# Patient Record
Sex: MALE | Race: WHITE | URBAN - METROPOLITAN AREA
[De-identification: names, ages, dates, MRNs, and addresses within clinical notes are randomized per-mention and may not be internally consistent; named-entity substitution may affect disease eponyms.]

---

## 2018-01-25 ENCOUNTER — APPOINTMENT (OUTPATIENT)
Dept: CT IMAGING | Facility: CLINIC | Age: 48
DRG: 194 | End: 2018-01-25
Attending: EMERGENCY MEDICINE
Payer: COMMERCIAL

## 2018-01-25 ENCOUNTER — HOSPITAL ENCOUNTER (INPATIENT)
Facility: CLINIC | Age: 48
LOS: 2 days | Discharge: HOME | DRG: 194 | End: 2018-01-27
Attending: EMERGENCY MEDICINE | Admitting: INTERNAL MEDICINE
Payer: COMMERCIAL

## 2018-01-25 ENCOUNTER — APPOINTMENT (OUTPATIENT)
Dept: GENERAL RADIOLOGY | Facility: CLINIC | Age: 48
DRG: 194 | End: 2018-01-25
Attending: EMERGENCY MEDICINE
Payer: COMMERCIAL

## 2018-01-25 DIAGNOSIS — J18.9 PNEUMONIA OF BOTH LUNGS DUE TO INFECTIOUS ORGANISM, UNSPECIFIED PART OF LUNG: ICD-10-CM

## 2018-01-25 LAB
ANION GAP SERPL CALCULATED.3IONS-SCNC: 8 MMOL/L (ref 3–14)
BASOPHILS # BLD AUTO: 0 10E9/L (ref 0–0.2)
BASOPHILS NFR BLD AUTO: 0.3 %
BUN SERPL-MCNC: 19 MG/DL (ref 7–30)
CALCIUM SERPL-MCNC: 8.7 MG/DL (ref 8.5–10.1)
CHLORIDE SERPL-SCNC: 88 MMOL/L (ref 94–109)
CO2 SERPL-SCNC: 29 MMOL/L (ref 20–32)
CREAT SERPL-MCNC: 1.01 MG/DL (ref 0.66–1.25)
DIFFERENTIAL METHOD BLD: ABNORMAL
EOSINOPHIL # BLD AUTO: 0 10E9/L (ref 0–0.7)
EOSINOPHIL NFR BLD AUTO: 0 %
ERYTHROCYTE [DISTWIDTH] IN BLOOD BY AUTOMATED COUNT: 11.8 % (ref 10–15)
FLUAV+FLUBV AG SPEC QL: NEGATIVE
FLUAV+FLUBV AG SPEC QL: NEGATIVE
GFR SERPL CREATININE-BSD FRML MDRD: 79 ML/MIN/1.7M2
GLUCOSE SERPL-MCNC: 110 MG/DL (ref 70–99)
HCT VFR BLD AUTO: 41.9 % (ref 40–53)
HGB BLD-MCNC: 14.9 G/DL (ref 13.3–17.7)
IMM GRANULOCYTES # BLD: 0.3 10E9/L (ref 0–0.4)
IMM GRANULOCYTES NFR BLD: 2.6 %
LACTATE SERPL-SCNC: 1.2 MMOL/L (ref 0.4–2)
LYMPHOCYTES # BLD AUTO: 1.1 10E9/L (ref 0.8–5.3)
LYMPHOCYTES NFR BLD AUTO: 8.7 %
MAGNESIUM SERPL-MCNC: 2.2 MG/DL (ref 1.6–2.3)
MCH RBC QN AUTO: 32 PG (ref 26.5–33)
MCHC RBC AUTO-ENTMCNC: 35.6 G/DL (ref 31.5–36.5)
MCV RBC AUTO: 90 FL (ref 78–100)
MONOCYTES # BLD AUTO: 1.4 10E9/L (ref 0–1.3)
MONOCYTES NFR BLD AUTO: 11.1 %
NEUTROPHILS # BLD AUTO: 9.8 10E9/L (ref 1.6–8.3)
NEUTROPHILS NFR BLD AUTO: 77.3 %
NRBC # BLD AUTO: 0 10*3/UL
NRBC BLD AUTO-RTO: 0 /100
PLATELET # BLD AUTO: 335 10E9/L (ref 150–450)
POTASSIUM SERPL-SCNC: 2.6 MMOL/L (ref 3.4–5.3)
RBC # BLD AUTO: 4.66 10E12/L (ref 4.4–5.9)
SODIUM SERPL-SCNC: 125 MMOL/L (ref 133–144)
SPECIMEN SOURCE: NORMAL
WBC # BLD AUTO: 12.7 10E9/L (ref 4–11)

## 2018-01-25 PROCEDURE — 85025 COMPLETE CBC W/AUTO DIFF WBC: CPT | Performed by: PHYSICIAN ASSISTANT

## 2018-01-25 PROCEDURE — 25000128 H RX IP 250 OP 636: Performed by: EMERGENCY MEDICINE

## 2018-01-25 PROCEDURE — 80048 BASIC METABOLIC PNL TOTAL CA: CPT | Performed by: PHYSICIAN ASSISTANT

## 2018-01-25 PROCEDURE — 87040 BLOOD CULTURE FOR BACTERIA: CPT | Performed by: EMERGENCY MEDICINE

## 2018-01-25 PROCEDURE — 99223 1ST HOSP IP/OBS HIGH 75: CPT | Mod: AI | Performed by: INTERNAL MEDICINE

## 2018-01-25 PROCEDURE — 96361 HYDRATE IV INFUSION ADD-ON: CPT

## 2018-01-25 PROCEDURE — 25000128 H RX IP 250 OP 636: Performed by: PHYSICIAN ASSISTANT

## 2018-01-25 PROCEDURE — 12000000 ZZH R&B MED SURG/OB

## 2018-01-25 PROCEDURE — 87804 INFLUENZA ASSAY W/OPTIC: CPT | Performed by: EMERGENCY MEDICINE

## 2018-01-25 PROCEDURE — 83735 ASSAY OF MAGNESIUM: CPT | Performed by: PHYSICIAN ASSISTANT

## 2018-01-25 PROCEDURE — 96367 TX/PROPH/DG ADDL SEQ IV INF: CPT

## 2018-01-25 PROCEDURE — 99285 EMERGENCY DEPT VISIT HI MDM: CPT | Mod: 25

## 2018-01-25 PROCEDURE — 25000132 ZZH RX MED GY IP 250 OP 250 PS 637: Performed by: EMERGENCY MEDICINE

## 2018-01-25 PROCEDURE — 71260 CT THORAX DX C+: CPT

## 2018-01-25 PROCEDURE — 96365 THER/PROPH/DIAG IV INF INIT: CPT

## 2018-01-25 PROCEDURE — 36415 COLL VENOUS BLD VENIPUNCTURE: CPT

## 2018-01-25 PROCEDURE — 96366 THER/PROPH/DIAG IV INF ADDON: CPT

## 2018-01-25 PROCEDURE — 83605 ASSAY OF LACTIC ACID: CPT | Performed by: EMERGENCY MEDICINE

## 2018-01-25 PROCEDURE — 36415 COLL VENOUS BLD VENIPUNCTURE: CPT | Performed by: EMERGENCY MEDICINE

## 2018-01-25 PROCEDURE — 96375 TX/PRO/DX INJ NEW DRUG ADDON: CPT

## 2018-01-25 PROCEDURE — 93005 ELECTROCARDIOGRAM TRACING: CPT

## 2018-01-25 PROCEDURE — 71046 X-RAY EXAM CHEST 2 VIEWS: CPT

## 2018-01-25 RX ORDER — POTASSIUM CHLORIDE 29.8 MG/ML
20 INJECTION INTRAVENOUS
Status: DISCONTINUED | OUTPATIENT
Start: 2018-01-25 | End: 2018-01-27 | Stop reason: HOSPADM

## 2018-01-25 RX ORDER — LISINOPRIL AND HYDROCHLOROTHIAZIDE 12.5; 2 MG/1; MG/1
2 TABLET ORAL DAILY
COMMUNITY

## 2018-01-25 RX ORDER — MAGNESIUM SULFATE HEPTAHYDRATE 40 MG/ML
4 INJECTION, SOLUTION INTRAVENOUS EVERY 4 HOURS PRN
Status: DISCONTINUED | OUTPATIENT
Start: 2018-01-25 | End: 2018-01-27 | Stop reason: HOSPADM

## 2018-01-25 RX ORDER — ONDANSETRON 2 MG/ML
4 INJECTION INTRAMUSCULAR; INTRAVENOUS EVERY 30 MIN PRN
Status: DISCONTINUED | OUTPATIENT
Start: 2018-01-25 | End: 2018-01-26

## 2018-01-25 RX ORDER — LISINOPRIL 20 MG/1
20 TABLET ORAL DAILY
Status: DISCONTINUED | OUTPATIENT
Start: 2018-01-26 | End: 2018-01-27 | Stop reason: HOSPADM

## 2018-01-25 RX ORDER — ACETAMINOPHEN 325 MG/1
650 TABLET ORAL EVERY 4 HOURS PRN
Status: DISCONTINUED | OUTPATIENT
Start: 2018-01-25 | End: 2018-01-27 | Stop reason: HOSPADM

## 2018-01-25 RX ORDER — ONDANSETRON 2 MG/ML
4 INJECTION INTRAMUSCULAR; INTRAVENOUS EVERY 6 HOURS PRN
Status: DISCONTINUED | OUTPATIENT
Start: 2018-01-25 | End: 2018-01-27 | Stop reason: HOSPADM

## 2018-01-25 RX ORDER — AMLODIPINE BESYLATE 5 MG/1
5 TABLET ORAL DAILY
Status: DISCONTINUED | OUTPATIENT
Start: 2018-01-26 | End: 2018-01-27 | Stop reason: HOSPADM

## 2018-01-25 RX ORDER — IBUPROFEN 600 MG/1
600 TABLET, FILM COATED ORAL EVERY 6 HOURS PRN
Status: DISCONTINUED | OUTPATIENT
Start: 2018-01-25 | End: 2018-01-27 | Stop reason: HOSPADM

## 2018-01-25 RX ORDER — OXYCODONE HYDROCHLORIDE 5 MG/1
5 TABLET ORAL EVERY 4 HOURS PRN
Status: DISCONTINUED | OUTPATIENT
Start: 2018-01-25 | End: 2018-01-27 | Stop reason: HOSPADM

## 2018-01-25 RX ORDER — IBUPROFEN 800 MG/1
800 TABLET, FILM COATED ORAL EVERY 8 HOURS PRN
COMMUNITY

## 2018-01-25 RX ORDER — AMLODIPINE BESYLATE 5 MG/1
5 TABLET ORAL DAILY
COMMUNITY

## 2018-01-25 RX ORDER — POTASSIUM CHLORIDE 1500 MG/1
20-40 TABLET, EXTENDED RELEASE ORAL
Status: DISCONTINUED | OUTPATIENT
Start: 2018-01-25 | End: 2018-01-27 | Stop reason: HOSPADM

## 2018-01-25 RX ORDER — ACETAMINOPHEN 325 MG/1
650 TABLET ORAL ONCE
Status: COMPLETED | OUTPATIENT
Start: 2018-01-25 | End: 2018-01-25

## 2018-01-25 RX ORDER — PROCHLORPERAZINE 25 MG
25 SUPPOSITORY, RECTAL RECTAL EVERY 12 HOURS PRN
Status: DISCONTINUED | OUTPATIENT
Start: 2018-01-25 | End: 2018-01-27 | Stop reason: HOSPADM

## 2018-01-25 RX ORDER — POTASSIUM CHLORIDE 1.5 G/1.58G
20 POWDER, FOR SOLUTION ORAL ONCE
Status: COMPLETED | OUTPATIENT
Start: 2018-01-25 | End: 2018-01-25

## 2018-01-25 RX ORDER — POTASSIUM CL/LIDO/0.9 % NACL 10MEQ/0.1L
10 INTRAVENOUS SOLUTION, PIGGYBACK (ML) INTRAVENOUS
Status: DISCONTINUED | OUTPATIENT
Start: 2018-01-25 | End: 2018-01-27 | Stop reason: HOSPADM

## 2018-01-25 RX ORDER — SODIUM CHLORIDE AND POTASSIUM CHLORIDE 150; 900 MG/100ML; MG/100ML
INJECTION, SOLUTION INTRAVENOUS CONTINUOUS
Status: DISPENSED | OUTPATIENT
Start: 2018-01-25 | End: 2018-01-26

## 2018-01-25 RX ORDER — NALOXONE HYDROCHLORIDE 0.4 MG/ML
.1-.4 INJECTION, SOLUTION INTRAMUSCULAR; INTRAVENOUS; SUBCUTANEOUS
Status: DISCONTINUED | OUTPATIENT
Start: 2018-01-25 | End: 2018-01-27 | Stop reason: HOSPADM

## 2018-01-25 RX ORDER — IBUPROFEN 600 MG/1
600 TABLET, FILM COATED ORAL ONCE
Status: COMPLETED | OUTPATIENT
Start: 2018-01-25 | End: 2018-01-25

## 2018-01-25 RX ORDER — IOPAMIDOL 755 MG/ML
500 INJECTION, SOLUTION INTRAVASCULAR ONCE
Status: COMPLETED | OUTPATIENT
Start: 2018-01-25 | End: 2018-01-25

## 2018-01-25 RX ORDER — CEFTRIAXONE SODIUM 2 G/50ML
2 INJECTION, SOLUTION INTRAVENOUS ONCE
Status: COMPLETED | OUTPATIENT
Start: 2018-01-25 | End: 2018-01-25

## 2018-01-25 RX ORDER — CALCIUM CARBONATE 500 MG/1
1000 TABLET, CHEWABLE ORAL 4 TIMES DAILY PRN
Status: DISCONTINUED | OUTPATIENT
Start: 2018-01-25 | End: 2018-01-27 | Stop reason: HOSPADM

## 2018-01-25 RX ORDER — POTASSIUM CHLORIDE 7.45 MG/ML
10 INJECTION INTRAVENOUS
Status: DISCONTINUED | OUTPATIENT
Start: 2018-01-25 | End: 2018-01-27 | Stop reason: HOSPADM

## 2018-01-25 RX ORDER — CEFTRIAXONE SODIUM 2 G/50ML
2 INJECTION, SOLUTION INTRAVENOUS EVERY 24 HOURS
Status: DISCONTINUED | OUTPATIENT
Start: 2018-01-26 | End: 2018-01-27 | Stop reason: HOSPADM

## 2018-01-25 RX ORDER — ACETAMINOPHEN 500 MG
1000 TABLET ORAL EVERY 6 HOURS PRN
COMMUNITY

## 2018-01-25 RX ORDER — POTASSIUM CHLORIDE 1.5 G/1.58G
20-40 POWDER, FOR SOLUTION ORAL
Status: DISCONTINUED | OUTPATIENT
Start: 2018-01-25 | End: 2018-01-27 | Stop reason: HOSPADM

## 2018-01-25 RX ORDER — SODIUM CHLORIDE 9 MG/ML
1000 INJECTION, SOLUTION INTRAVENOUS CONTINUOUS
Status: DISCONTINUED | OUTPATIENT
Start: 2018-01-25 | End: 2018-01-27 | Stop reason: HOSPADM

## 2018-01-25 RX ORDER — ALBUTEROL SULFATE 0.83 MG/ML
2.5 SOLUTION RESPIRATORY (INHALATION) EVERY 4 HOURS PRN
Status: DISCONTINUED | OUTPATIENT
Start: 2018-01-25 | End: 2018-01-27 | Stop reason: HOSPADM

## 2018-01-25 RX ORDER — DIPHENHYDRAMINE HYDROCHLORIDE 50 MG/ML
25 INJECTION INTRAMUSCULAR; INTRAVENOUS ONCE
Status: COMPLETED | OUTPATIENT
Start: 2018-01-25 | End: 2018-01-25

## 2018-01-25 RX ORDER — ONDANSETRON 4 MG/1
4 TABLET, ORALLY DISINTEGRATING ORAL EVERY 6 HOURS PRN
Status: DISCONTINUED | OUTPATIENT
Start: 2018-01-25 | End: 2018-01-27 | Stop reason: HOSPADM

## 2018-01-25 RX ORDER — PROCHLORPERAZINE MALEATE 5 MG
10 TABLET ORAL EVERY 6 HOURS PRN
Status: DISCONTINUED | OUTPATIENT
Start: 2018-01-25 | End: 2018-01-27 | Stop reason: HOSPADM

## 2018-01-25 RX ADMIN — SODIUM CHLORIDE 66 ML: 9 INJECTION, SOLUTION INTRAVENOUS at 19:04

## 2018-01-25 RX ADMIN — Medication 10 MEQ: at 16:51

## 2018-01-25 RX ADMIN — PROCHLORPERAZINE EDISYLATE 5 MG: 5 INJECTION INTRAMUSCULAR; INTRAVENOUS at 16:48

## 2018-01-25 RX ADMIN — SODIUM CHLORIDE 1000 ML: 9 INJECTION, SOLUTION INTRAVENOUS at 15:07

## 2018-01-25 RX ADMIN — SODIUM CHLORIDE 1000 ML: 9 INJECTION, SOLUTION INTRAVENOUS at 18:20

## 2018-01-25 RX ADMIN — IBUPROFEN 600 MG: 600 TABLET ORAL at 20:50

## 2018-01-25 RX ADMIN — CEFTRIAXONE SODIUM 2 G: 2 INJECTION, SOLUTION INTRAVENOUS at 18:20

## 2018-01-25 RX ADMIN — ONDANSETRON 4 MG: 2 INJECTION INTRAMUSCULAR; INTRAVENOUS at 15:07

## 2018-01-25 RX ADMIN — SODIUM CHLORIDE 1000 ML: 9 INJECTION, SOLUTION INTRAVENOUS at 16:30

## 2018-01-25 RX ADMIN — POTASSIUM CHLORIDE 20 MEQ: 1.5 POWDER, FOR SOLUTION ORAL at 19:48

## 2018-01-25 RX ADMIN — IOPAMIDOL 80 ML: 755 INJECTION, SOLUTION INTRAVENOUS at 19:03

## 2018-01-25 RX ADMIN — AZITHROMYCIN MONOHYDRATE 500 MG: 500 INJECTION, POWDER, LYOPHILIZED, FOR SOLUTION INTRAVENOUS at 18:45

## 2018-01-25 RX ADMIN — ACETAMINOPHEN 650 MG: 325 TABLET, FILM COATED ORAL at 22:08

## 2018-01-25 RX ADMIN — DIPHENHYDRAMINE HYDROCHLORIDE 25 MG: 50 INJECTION, SOLUTION INTRAMUSCULAR; INTRAVENOUS at 16:48

## 2018-01-25 ASSESSMENT — ENCOUNTER SYMPTOMS
COUGH: 1
HEADACHES: 1
DIAPHORESIS: 1
NAUSEA: 1
NECK PAIN: 1
VOMITING: 0
DIZZINESS: 1
FEVER: 1
DYSURIA: 0
BACK PAIN: 1

## 2018-01-25 NOTE — IP AVS SNAPSHOT
[place] [address] [facility]    [address]    [facility] [address]    Phone:  [phone]    Fax:  [phone]                                       After Visit Summary   1/25/2018    Cb Og    MRN: [id number]           After Visit Summary Signature Page     I have received my discharge instructions, and my questions have been answered. I have discussed any challenges I see with this plan with the nurse or doctor.    ..........................................................................................................................................  Patient/Patient Representative Signature      ..........................................................................................................................................  Patient Representative Print Name and Relationship to Patient    ..................................................               ................................................  Date                                            Time    ..........................................................................................................................................  Reviewed by Signature/Title    ...................................................              ..............................................  Date                                                            Time

## 2018-01-25 NOTE — ED PROVIDER NOTES
History     Chief Complaint:  Flu Symptoms    HPI   Cb Og is a 47 year old male who presents with flu symptoms. The patient reports he came home early from work last Thursday not feeling well and spent the rest of the day in bed. He states he has had a fever and been coughing since then. The patient reports that he began vomiting a lot on Sunday, although he has not vomited since then. He states he is still nauseous though. The patient also reports having a constant headache for the last 5 days that is dulled when he takes ibuprofen and Tylenol (last dose around 10 am today), but it has never fully gone away. The patient decided to come to the emergency department today because his symptoms have stayed the same and not gone away. He reports he is dizzy and diaphoretic when he tries to walk. He also states has some pain at the base of his neck and back pain due to a pulled muscle from coughing so much. He states he has been exposed to illness at work and that he did not receive a flu shot. He denies dysuria or current vomiting.      Allergies:  No known drug allergies     Medications:    Lisinopril    Past Medical History:    Hypertension    Past Surgical History:    History reviewed. No pertinent surgical history.    Family History:    History reviewed. No pertinent family history.     Social History:  Smoking status: Not on file  Alcohol use: Not on file  Marital Status:  Single [1]     Review of Systems   Constitutional: Positive for diaphoresis and fever.   Respiratory: Positive for cough.    Gastrointestinal: Positive for nausea. Negative for vomiting.   Genitourinary: Negative for dysuria.   Musculoskeletal: Positive for back pain and neck pain.   Neurological: Positive for dizziness and headaches.   All other systems reviewed and are negative.    Physical Exam     Patient Vitals for the past 24 hrs:   BP Temp Temp src Heart Rate Resp SpO2   01/25/18 2045 - 100.9  F (38.3  C) Oral - - -   01/25/18  2015 - - - - - 93 %   01/25/18 2000 - - - - - 94 %   01/25/18 1951 - - - - - 94 %   01/25/18 1949 112/80 - - - - -   01/25/18 1830 - - - - - 94 %   01/25/18 1730 101/71 - - - - 96 %   01/25/18 1715 - - - - - 96 %   01/25/18 1714 107/68 - - - - -   01/25/18 1645 - - - 89 27 97 %   01/25/18 1630 - - - 89 (!) 32 96 %   01/25/18 1615 - - - 89 (!) 33 94 %   01/25/18 1602 115/80 - - - - 93 %   01/25/18 1404 106/84 98.1  F (36.7  C) Oral 109 16 96 %       Physical Exam  General: Patient is alert and interactive when I enter the room, laying in bed, tired appearing  Head:  The scalp, face, and head appear normal  Eyes:  Conjunctivae are normal  ENT:    The nose is normal    Pinnae are normal    External acoustic canals are normal, dry mucous membranes   Neck:  Trachea midline, good ROM  CV:  Pulses are normal, RRR.    Resp:  No respiratory distress, crackles on right upper lobe, mild expiratory wheezing, left side clear   Abdomen:      Soft, non-tender, non-distended  Musc:  Normal muscular tone    No major joint effusions    No asymmetric leg swelling    Good capillary refill noted  Skin:  No rash or lesions noted  Neuro:  Speech is normal and fluent. Face is symmetric.     Moving all extremities well.   Psych: Awake. Alert.  Normal affect.  Appropriate interactions.    Emergency Department Course   ECG (16:04:12):  Rate 93 bpm. NJ interval 152. QRS duration 96. QT/QTc 368/457. P-R-T axes 29 19 22. Normal sinus rhythm. Normal ECG. Interpreted at 1716 by Inés Muhammad MD.    Imaging:  Radiographic findings were communicated with the patient who voiced understanding of the findings.    XR Chest 2 Views:  7 cm area of opacity right upper lung. This may be  infiltrate. Underlying mass cannot be excluded.  As read by Radiology.    CT Chest Pulmonary Embolism w Contrast:  Bilateral consolidative infiltrate/pneumonia, greater on  the right. Short-term radiographic follow-up after therapy recommended  to confirm resolution.  Mild adenopathy.  As read by Radiology.    Laboratory:  Influenza A/B Antigen: negative  CBC: WBC 12.7, o/w WNL (HGB 14.9, )   BMP:  (L), Potassium 2.6 (LL), Chloride 88 (L), Glucose 110 (H), o/w WNL (Creatinine 1.01)  Magnesium: 2.2  Lactic acid: 1.2    Blood culture: in process    Interventions:  1507: NS 1L IV Bolus  1507: Zofran 4 mg IV  1630: NS 1L IV Bolus  1648: Compazine 5 mg IV  1648: Benadryl 25 mg IV  1651: Potassium Chloride 10 mEq in 100 ml IV  1820: NS 1L IV Bolus  1820: Rocephin 2 g IV  1845: Zithromax 500 mg IV  1948: Potassium chloride 20 mEq PO  2050: Ibuprofen 600 mg PO    Emergency Department Course:  Past medical records, nursing notes, and vitals reviewed.  1609: I performed an exam of the patient and obtained history, as documented above.  IV inserted and blood drawn.  The patient was sent for a chest x-ray while in the emergency department, findings above.  EKG obtained, results above.    2030: I rechecked the patient. Explained findings to the patient.    2041: I spoke to Dr. Toledo of the hospitalist service who accepts the patient for admission.     Findings and plan explained to the Patient who consents to admission.     Discussed the patient with Dr. Toledo, who will admit the patient to a med bed for further monitoring, evaluation, and treatment.     Impression & Plan    Medical Decision Making:  Cb Og is a 47 year old gentleman who presents with flu-like symptoms and fever for the past 7 days. On arrival, patient was afebrile but tachycardic to 106 with soft blood pressures. He was not hypoxic. He appeared quite unwell, appeared fatigued and dehydrated. IV fluids were given and blood work was checked. His white count was elevated at 12.7 and he was quite hyponatremic at 125 and hypokalemic at 2.6. His magnesium was normal. Patient was given 2 liters of fluids. His lactate was normal at 1.2. I did obtain a chest x-ray that revealed a pretty large right upper lung  opacity about 7 cm, which is likely infiltrate, however cannot rule out an underlying mass. With this, I did obtain a CT PE study. This revealed bilateral consolidation and infiltrate greater on the right. Patient felt improved after fluids and was not hypoxic, however, he definitely has a large pneumonia burden. Blood cultures were obtained and antibiotics of Rocephin and Zithromax were given. He does not have any risk factors for hospital acquired pneumonia so I do not think he needs broad-spectrum antibiotics, however, this might change. He is at high risk for decompensation. Influenza was negative both here and at clinic but it is possible that this is influenza as well. Patient was admitted to medicine for treatment of his pneumonia and continued observation.    Diagnosis:    ICD-10-CM    1. Pneumonia of both lungs due to infectious organism, unspecified part of lung J18.9        Disposition:  Admitted    Amy Samanta  1/25/2018   [facility] EMERGENCY DEPARTMENT    IAmy, am serving as a scribe at 4:09 PM on 1/25/2018 to document services personally performed by Inés Muhammad MD based on my observations and the provider's statements to me.        Inés Muhammad MD  01/25/18 6257

## 2018-01-25 NOTE — IP AVS SNAPSHOT
MRN:[id number]                      After Visit Summary   1/25/2018    Cb Og    MRN: [id number]           Thank you!     Thank you for choosing [facility] for your care. Our goal is always to provide you with excellent care. Hearing back from our patients is one way we can continue to improve our services. Please take a few minutes to complete the written survey that you may receive in the mail after you visit. If you would like to speak to someone directly about your visit please contact Patient Relations at [phone]. Thank you!          Patient Information     Date Of Birth          1970        Designated Caregiver       Most Recent Value    Caregiver    Will someone help with your care after discharge? yes    Name of designated caregiver Natalie    Phone number of caregiver see chart    Caregiver address see chart      About your hospital stay     You were admitted on:  January 25, 2018 You last received care in the:  [place] [address] [facility]    You were discharged on:  January 27, 2018        Reason for your hospital stay       Pneumonia                  Who to Call     For medical emergencies, please call 911.  For non-urgent questions about your medical care, please call your primary care provider or clinic, [phone]          Attending Provider     Provider Specialty    Inés Muhammad MD Emergency Medicine    Barry Toledo MD Internal Medicine       Primary Care Provider Office Phone # Fax #    [facility] [phone]       When to contact your care team       Call your primary doctor if you have any of the following:  increased shortness of breath.                  After Care Instructions     Activity       Your activity upon discharge: activity as tolerated            Diet       Follow this diet upon discharge: Cardiac                  Follow-up Appointments     Follow-up and recommended labs and tests         "Follow up with primary care provider, Rudy [facility], within 7 days for hospital follow- up.  The following labs/tests are recommended: CXR in few weeks .                  Pending Results     Date and Time Order Name Status Description    2018 1638 Blood culture Preliminary     2018 1638 Blood culture Preliminary             Statement of Approval     Ordered          18 1058  I have reviewed and agree with all the recommendations and orders detailed in this document.  EFFECTIVE NOW     Approved and electronically signed by:  Meliton Chavira MD             Admission Information     Date & Time Provider Department Dept. Phone    2018 Barry Toledo MD [place] [address] [facility] [phone]      Your Vitals Were     Blood Pressure Pulse Temperature Respirations Height Weight    118/87 (BP Location: Right arm) 78 98.7  F (37.1  C) (Oral) 18 1.829 m (6') 112.6 kg (248 lb 3.2 oz)    Pulse Oximetry BMI (Body Mass Index)                95% 33.66 kg/m2          [employer] Information     [facility] [facility] lets you send messages to your doctor, view your test results, renew your prescriptions, schedule appointments and more. To sign up, go to [email or web address] . Click on \"Log in\" on the left side of the screen, which will take you to the Welcome page. Then click on \"Sign up Now\" on the right side of the page.     You will be asked to enter the access code listed below, as well as some personal information. Please follow the directions to create your username and password.     Your access code is: [email or web address]  Expires: 2018 11:02 AM     Your access code will  in 90 days. If you need help or a new code, please call your [facility] clinic or [phone].        Care EveryWhere ID     This is your Care EveryWhere ID. This could be used by other organizations to access your [facility] medical records  [id number]        Equal Access to Services     KAILEY BEVERLY AH: Loulouii donald delacruz " danuta Teran, waaxda luqadaha, qaybta kaalmada adetania, dane [facility] [place] [facility] [phone].    ATENCIÓN: Si gertrudela milan, tiene a wong disposición servicios gratuitos de asistencia lingüística. Gene al [phone].    We comply with applicable federal civil rights laws and [place] laws. We do not discriminate on the basis of race, color, national origin, age, disability, sex, sexual orientation, or gender identity.               Review of your medicines      START taking        Dose / Directions    levofloxacin 500 MG tablet   Commonly known as:  LEVAQUIN   Used for:  Pneumonia of both lungs due to infectious organism, unspecified part of lung        Dose:  500 mg   Take 1 tablet (500 mg) by mouth daily for 7 days   Quantity:  7 tablet   Refills:  0         CONTINUE these medicines which have NOT CHANGED        Dose / Directions    acetaminophen 500 MG tablet   Commonly known as:  TYLENOL        Dose:  1000 mg   Take 1,000 mg by mouth every 6 hours as needed for mild pain   Refills:  0       amLODIPine 5 MG tablet   Commonly known as:  NORVASC        Dose:  5 mg   Take 5 mg by mouth daily   Refills:  0       ibuprofen 800 MG tablet   Commonly known as:  ADVIL/MOTRIN        Dose:  800 mg   Take 800 mg by mouth every 8 hours as needed for moderate pain   Refills:  0       lisinopril-hydrochlorothiazide 20-12.5 MG per tablet   Commonly known as:  PRINZIDE/ZESTORETIC        Dose:  2 tablet   Take 2 tablets by mouth daily   Refills:  0            Where to get your medicines      These medications were sent to [facility]/pharmacy #[id number] - [place], [place] - [address]  [address]  [address] , [facility] [address]     Phone:  [phone]     levofloxacin 500 MG tablet                Protect others around you: Learn how to safely use, store and throw away your medicines at www.disposemymeds.org.        ANTIBIOTIC INSTRUCTION     You've Been Prescribed an Antibiotic - Now What?  Your  healthcare team thinks that you or your loved one might have an infection. Some infections can be treated with antibiotics, which are powerful, life-saving drugs. Like all medications, antibiotics have side effects and should only be used when necessary. There are some important things you should know about your antibiotic treatment.      Your healthcare team may run tests before you start taking an antibiotic.    Your team may take samples (e.g., from your blood, urine or other areas) to run tests to look for bacteria. These test can be important to determine if you need an antibiotic at all and, if you do, which antibiotic will work best.      Within a few days, your healthcare team might change or even stop your antibiotic.    Your team may start you on an antibiotic while they are working to find out what is making you sick.    Your team might change your antibiotic because test results show that a different antibiotic would be better to treat your infection.    In some cases, once your team has more information, they learn that you do not need an antibiotic at all. They may find out that you don't have an infection, or that the antibiotic you're taking won't work against your infection. For example, an infection caused by a virus can't be treated with antibiotics. Staying on an antibiotic when you don't need it is more likely to be harmful than helpful.      You may experience side effects from your antibiotic.    Like all medications, antibiotics have side effects. Some of these can be serious.    Let you healthcare team know if you have any known allergies when you are admitted to the hospital.    One significant side effect of nearly all antibiotics is the risk of severe and sometimes deadly diarrhea caused by Clostridium difficile (C. Difficile). This occurs when a person takes antibiotics because some good germs are destroyed. Antibiotic use allows C. diificile to take over, putting patients at high risk  for this serious infection.    As a patient or caregiver, it is important to understand your or your loved one's antibiotic treatment. It is especially important for caregivers to speak up when patients can't speak for themselves. Here are some important questions to ask your healthcare team.    What infection is this antibiotic treating and how do you know I have that infection?    What side effects might occur from this antibiotic?    How long will I need to take this antibiotic?    Is it safe to take this antibiotic with other medications or supplements (e.g., vitamins) that I am taking?     Are there any special directions I need to know about taking this antibiotic? For example, should I take it with food?    How will I be monitored to know whether my infection is responding to the antibiotic?    What tests may help to make sure the right antibiotic is prescribed for me?      Information provided by:  www.cdc.gov/getsmart  U.S. Department of Health and Human Services  Centers for disease Control and Prevention  National Center for Emerging and Zoonotic Infectious Diseases  Division of Healthcare Quality Promotion             Medication List: This is a list of all your medications and when to take them. Check marks below indicate your daily home schedule. Keep this list as a reference.      Medications           Morning Afternoon Evening Bedtime As Needed    acetaminophen 500 MG tablet   Commonly known as:  TYLENOL   Take 1,000 mg by mouth every 6 hours as needed for mild pain   Last time this was given:  650 mg on 1/26/2018  4:32 PM                                amLODIPine 5 MG tablet   Commonly known as:  NORVASC   Take 5 mg by mouth daily   Last time this was given:  5 mg on 1/27/2018  8:13 AM                                ibuprofen 800 MG tablet   Commonly known as:  ADVIL/MOTRIN   Take 800 mg by mouth every 8 hours as needed for moderate pain   Last time this was given:  600 mg on 1/26/2018 11:29 AM                                 levofloxacin 500 MG tablet   Commonly known as:  LEVAQUIN   Take 1 tablet (500 mg) by mouth daily for 7 days                                lisinopril-hydrochlorothiazide 20-12.5 MG per tablet   Commonly known as:  PRINZIDE/ZESTORETIC   Take 2 tablets by mouth daily

## 2018-01-25 NOTE — ED NOTES
Patient presents with flu-like symptoms. Patient has had nausea and vomiting since last Thursday. Patient has also had coughing. Patient now has extreme weakness and is dehydrated. ABCDs intact. Alert and oriented x 4.

## 2018-01-26 LAB
ANION GAP SERPL CALCULATED.3IONS-SCNC: 6 MMOL/L (ref 3–14)
BUN SERPL-MCNC: 13 MG/DL (ref 7–30)
C DIFF TOX B STL QL: NEGATIVE
CALCIUM SERPL-MCNC: 7.9 MG/DL (ref 8.5–10.1)
CHLORIDE SERPL-SCNC: 99 MMOL/L (ref 94–109)
CO2 SERPL-SCNC: 30 MMOL/L (ref 20–32)
CREAT SERPL-MCNC: 0.8 MG/DL (ref 0.66–1.25)
ERYTHROCYTE [DISTWIDTH] IN BLOOD BY AUTOMATED COUNT: 11.9 % (ref 10–15)
GFR SERPL CREATININE-BSD FRML MDRD: >90 ML/MIN/1.7M2
GLUCOSE SERPL-MCNC: 88 MG/DL (ref 70–99)
HCT VFR BLD AUTO: 34.2 % (ref 40–53)
HGB BLD-MCNC: 12 G/DL (ref 13.3–17.7)
INTERPRETATION ECG - MUSE: NORMAL
LACTATE BLD-SCNC: 0.7 MMOL/L (ref 0.7–2)
MCH RBC QN AUTO: 31.7 PG (ref 26.5–33)
MCHC RBC AUTO-ENTMCNC: 35.1 G/DL (ref 31.5–36.5)
MCV RBC AUTO: 91 FL (ref 78–100)
PLATELET # BLD AUTO: 281 10E9/L (ref 150–450)
POTASSIUM SERPL-SCNC: 2.6 MMOL/L (ref 3.4–5.3)
POTASSIUM SERPL-SCNC: 3.4 MMOL/L (ref 3.4–5.3)
RBC # BLD AUTO: 3.78 10E12/L (ref 4.4–5.9)
SODIUM SERPL-SCNC: 135 MMOL/L (ref 133–144)
SPECIMEN SOURCE: NORMAL
WBC # BLD AUTO: 9.1 10E9/L (ref 4–11)

## 2018-01-26 PROCEDURE — 84132 ASSAY OF SERUM POTASSIUM: CPT | Performed by: INTERNAL MEDICINE

## 2018-01-26 PROCEDURE — 25000128 H RX IP 250 OP 636: Performed by: PHYSICIAN ASSISTANT

## 2018-01-26 PROCEDURE — 36415 COLL VENOUS BLD VENIPUNCTURE: CPT | Performed by: INTERNAL MEDICINE

## 2018-01-26 PROCEDURE — 40000274 ZZH STATISTIC RCP CONSULT EA 30 MIN

## 2018-01-26 PROCEDURE — 87493 C DIFF AMPLIFIED PROBE: CPT | Performed by: INTERNAL MEDICINE

## 2018-01-26 PROCEDURE — 85027 COMPLETE CBC AUTOMATED: CPT | Performed by: INTERNAL MEDICINE

## 2018-01-26 PROCEDURE — 25000132 ZZH RX MED GY IP 250 OP 250 PS 637: Performed by: INTERNAL MEDICINE

## 2018-01-26 PROCEDURE — 83605 ASSAY OF LACTIC ACID: CPT | Performed by: INTERNAL MEDICINE

## 2018-01-26 PROCEDURE — 80048 BASIC METABOLIC PNL TOTAL CA: CPT | Performed by: INTERNAL MEDICINE

## 2018-01-26 PROCEDURE — 25000128 H RX IP 250 OP 636: Performed by: INTERNAL MEDICINE

## 2018-01-26 PROCEDURE — 12000000 ZZH R&B MED SURG/OB

## 2018-01-26 PROCEDURE — 99232 SBSQ HOSP IP/OBS MODERATE 35: CPT | Performed by: INTERNAL MEDICINE

## 2018-01-26 RX ORDER — BENZONATATE 100 MG/1
100 CAPSULE ORAL 3 TIMES DAILY PRN
Status: DISCONTINUED | OUTPATIENT
Start: 2018-01-26 | End: 2018-01-27 | Stop reason: HOSPADM

## 2018-01-26 RX ADMIN — POTASSIUM CHLORIDE 40 MEQ: 1500 TABLET, EXTENDED RELEASE ORAL at 01:39

## 2018-01-26 RX ADMIN — ACETAMINOPHEN 650 MG: 325 TABLET, FILM COATED ORAL at 16:32

## 2018-01-26 RX ADMIN — IBUPROFEN 600 MG: 600 TABLET ORAL at 11:29

## 2018-01-26 RX ADMIN — AMLODIPINE BESYLATE 5 MG: 5 TABLET ORAL at 09:51

## 2018-01-26 RX ADMIN — CEFTRIAXONE SODIUM 2 G: 2 INJECTION, SOLUTION INTRAVENOUS at 16:35

## 2018-01-26 RX ADMIN — POTASSIUM CHLORIDE 20 MEQ: 1500 TABLET, EXTENDED RELEASE ORAL at 13:19

## 2018-01-26 RX ADMIN — AZITHROMYCIN MONOHYDRATE 500 MG: 500 INJECTION, POWDER, LYOPHILIZED, FOR SOLUTION INTRAVENOUS at 17:17

## 2018-01-26 RX ADMIN — SODIUM CHLORIDE 1000 ML: 9 INJECTION, SOLUTION INTRAVENOUS at 09:52

## 2018-01-26 RX ADMIN — LISINOPRIL 20 MG: 20 TABLET ORAL at 09:51

## 2018-01-26 RX ADMIN — POTASSIUM CHLORIDE AND SODIUM CHLORIDE: 900; 150 INJECTION, SOLUTION INTRAVENOUS at 00:14

## 2018-01-26 RX ADMIN — POTASSIUM CHLORIDE 20 MEQ: 1500 TABLET, EXTENDED RELEASE ORAL at 04:10

## 2018-01-26 ASSESSMENT — ACTIVITIES OF DAILY LIVING (ADL)
ADLS_ACUITY_SCORE: 11

## 2018-01-26 ASSESSMENT — PAIN DESCRIPTION - DESCRIPTORS: DESCRIPTORS: CONSTANT;DULL

## 2018-01-26 NOTE — PLAN OF CARE
Problem: Patient Care Overview  Goal: Plan of Care/Patient Progress Review  Outcome: No Change  Pt resting comfortably. Denies pain. VSS- soft BP. A&O. Potassium level 2.6. Replaced with oral potassium. IVF infusing at 100ml/hr. Triggered sepsis, VSS- soft BP, lactic 0.7. Transfers with SBA/Up ad chris. Will continue to monitor.

## 2018-01-26 NOTE — PHARMACY-ADMISSION MEDICATION HISTORY
Admission medication history interview status for this patient is complete. See [facility] admission navigator for allergy information, prior to admission medications and immunization status.     Medication history interview source(s):Patient and Family  Medication history resources (including written lists, pill bottles, clinic record):[facility] list  Primary pharmacy:[facility]    Changes made to PTA medication list:  Added: lisinopril/hctz, amlodipine, ibuprofen, tylenol  Deleted: lisinopril  Changed: none    Actions taken by pharmacist (provider contacted, etc):Called [facility] to verify the regimen for high blood pressure meds.     Additional medication history information:None    Medication reconciliation/reorder completed by provider prior to medication history? No    Do you take OTC medications (eg tylenol, ibuprofen, fish oil, eye/ear drops, etc)? Y(Y/N)    For patients on insulin therapy: N (Y/N)  Lantus/levemir/NPH/Mix 70/30 dose:   (Y/N) (see Med list for doses)   Sliding scale Novolog Y/N  If Yes, do you have a baseline novolog pre-meal dose:  units with meals  Patients eat three meals a day:   Y/N    How many episodes of hypoglycemia do you have per week: _______  How many missed doses do you have per week: ______  How many times do you check your blood glucose per day: _______   Any Barriers to therapy - Be specific :  cost of medications, comfortable with giving injections (if applicable), comfortable and confident with current diabetes regimen: Y/N ______________      Prior to Admission medications    Medication Sig Last Dose Taking? Auth Provider   lisinopril-hydrochlorothiazide (PRINZIDE/ZESTORETIC) 20-12.5 MG per tablet Take 2 tablets by mouth daily 1/25/2018 at AM Yes Unknown, Entered By History   amLODIPine (NORVASC) 5 MG tablet Take 5 mg by mouth daily 1/25/2018 at AM Yes Unknown, Entered By History   ibuprofen (ADVIL/MOTRIN) 800 MG tablet Take 800 mg by mouth every 8 hours as needed for moderate pain  1/25/2018 at AM Yes Unknown, Entered By History   acetaminophen (TYLENOL) 500 MG tablet Take 1,000 mg by mouth every 6 hours as needed for mild pain 1/25/2018 at AM Yes Unknown, Entered By History

## 2018-01-26 NOTE — PROGRESS NOTES
"[facility] RCAT     Date: 1/26/18  Admission Dx: Pneumonia  Pulmonary History: Current smoker  Home Nebulizer/MDI Use: none  Home Oxygen: none  Acuity Level (RCAT flow sheet): Level 4  Aerosol Therapy initiated: Albuterol prn      Pulmonary Hygiene initiated: n/a      Volume Expansion initiated: Incentive spirometry      Current Oxygen Requirements: room air  Current SpO2: 93%    Re-evaluation date: 1/29/18    Patient Education: Indications for nebulizers      See \"RT Assessments\" flow sheet for patient assessment scoring and Acuity Level Details.             "

## 2018-01-26 NOTE — ED NOTES
[facility]  ED Nurse Handoff Report    Cb Og is a 47 year old male   ED Chief complaint: Flu Symptoms  . ED Diagnosis:   Final diagnoses:   None     Allergies: No Known Allergies    Code Status: Full Code  Activity level - Baseline/Home:  Independent. Activity Level - Current:   Stand with Assist. Lift room needed: No. Bariatric: No   Needed: No   Isolation: No. Infection: Not Applicable.     Vital Signs:   Vitals:    01/25/18 1730 01/25/18 1830 01/25/18 1949 01/25/18 1951   BP: 101/71  112/80    Resp:       Temp:       TempSrc:       SpO2: 96% 94%  94%       Cardiac Rhythm:  ,      Pain level: 0-10 Pain Scale: 8  Patient confused: No. Patient Falls Risk: Yes.   Elimination Status: Has voided   Patient Report - Initial Complaint: Shortness of breath and cough. Focused Assessment:  Cb Og is a 47 year old male who presents with flu symptoms. The patient reports he   came home early from work last Thursday not feeling well and spent the rest of the day in bed. He states he has had a fever and been coughing since then. The patient reports that he began vomiting a lot on Sunday, although he has not vomited since then. He states he is still nauseous though. The patient also reports having a constant headache for the last 5 days that is dulled when he takes ibuprofen and Tylenol (last dose around 10 am today), but it has never fully gone away. The patient decided to come to the emergency department today because his symptoms have stayed the same and not gone away. He reports he is dizzy and diaphoretic when he tries to walk. He also states has some pain at the base of his neck and back pain due to a pulled muscle from coughing so much. He states he has been exposed to illness at work and that he did not receive a flu shot. He denies dysuria or current vomiting.  Tests Performed: labs, CT, xray. Abnormal Results:   Labs Ordered and Resulted from Time of ED Arrival Up to the Time of  Departure from the ED   CBC WITH PLATELETS DIFFERENTIAL - Abnormal; Notable for the following:        Result Value    WBC 12.7 (*)     Absolute Neutrophil 9.8 (*)     Absolute Monocytes 1.4 (*)     All other components within normal limits   BASIC METABOLIC PANEL - Abnormal; Notable for the following:     Sodium 125 (*)     Potassium 2.6 (*)     Chloride 88 (*)     Glucose 110 (*)     All other components within normal limits   MAGNESIUM   LACTIC ACID   PERIPHERAL IV CATHETER   INFLUENZA A/B ANTIGEN   BLOOD CULTURE   BLOOD CULTURE   .   Treatments provided: IV fluids, nausea meds, antibiotics. Please see MAR  Family Comments: Wife and child at home.   OBS brochure/video discussed/provided to patient:  No  ED Medications:   Medications   0.9% sodium chloride BOLUS (0 mLs Intravenous Stopped 1/25/18 1607)     Followed by   0.9% sodium chloride infusion (1,000 mLs Intravenous New Bag 1/25/18 1820)   ondansetron (ZOFRAN) injection 4 mg (4 mg Intravenous Given 1/25/18 1507)   potassium chloride 10 mEq in 100 mL intermittent infusion with 10 mg lidocaine (0 mEq Intravenous Stopped 1/25/18 1832)   0.9% sodium chloride BOLUS (0 mLs Intravenous Stopped 1/25/18 1753)   prochlorperazine (COMPAZINE) injection 5 mg (5 mg Intravenous Given 1/25/18 1648)   diphenhydrAMINE (BENADRYL) injection 25 mg (25 mg Intravenous Given 1/25/18 1648)   cefTRIAXone IN D5W (ROCEPHIN) intermittent infusion 2 g (0 g Intravenous Stopped 1/25/18 1845)   azithromycin (ZITHROMAX) 500 mg in NaCl 0.9 % 250 mL intermittent infusion (500 mg Intravenous New Bag 1/25/18 1845)   0.9% sodium chloride BOLUS (0 mLs Intravenous Stopped 1/25/18 1905)   iopamidol (ISOVUE-370) solution 500 mL (80 mLs Intravenous Given 1/25/18 1903)   potassium chloride (KLOR-CON) Packet 20 mEq (20 mEq Oral Given 1/25/18 1948)       CT scan shows bilateral pneumonia.     Drips infusing:  Yes  For the majority of the shift, the patient's behavior Green. Interventions performed were  None.     Severe Sepsis OR Septic Shock Diagnosis Present: No      ED Nurse Name/Phone Number: Zehra Carbone,   8:08 PM   RECEIVING UNIT ED HANDOFF REVIEW    Above ED Nurse Handoff Report was reviewed: Yes  Reviewed by: Yudelka Lucio on January 25, 2018 at 10:12 PM

## 2018-01-26 NOTE — H&P
Admitted:     01/25/2018      DATE OF ADMISSION: 01/25/2018      CHIEF COMPLAINT:  Cough, fever.      HISTORY OF PRESENT ILLNESS:  Cb Og is a 47-year-old gentleman with past medical history significant for hypertension who was relatively at his baseline state of health until 2 days ago when he started feeling unwell, coughing and developed fever.  The patient also reported episode of vomiting about 6 days ago which subsided by itself.  The patient also has on and off headache which is new for him.  He took ibuprofen and Tylenol.  The patient did not feel well and felt more weak and tired. He was concerned and came to the emergency room.  The patient has cough nonproductive of sputum, has chest discomfort after protracted coughing.  No runny nose or sore throat.  No urinary or bowel habit change.  In the emergency room, he was evaluated.  Electrolytes are deranged, sodium is 125, potassium 2.6.  Chest x-ray showed areas of opacity in the right upper lung and CT scan of the chest was done which showed bilateral pneumonia. The patient was given a dose of Zithromax and Rocephin, and being admitted to the hospital.      PAST MEDICAL HISTORY:   Hypertension.      PAST SURGICAL HISTORY:  None.      FAMILY HISTORY:  Reviewed.  No known family medical condition to the patient.      SOCIAL HISTORY:  He smokes about 1 pack per day since age 14.  He occasionally drinks alcohol.  He does not use illicit drugs.  He is in the emergency room with his wife.      REVIEW OF SYSTEMS:  Ten points reviewed, all are negative except those mentioned in history of present illness.  The patient has fever, diaphoresis, cough, nausea, neck pain, dizziness, headache, back pain on and off,  otherwise negative.      HOME MEDICATIONS: Amlodipine, Tylenol, ibuprofen, lisinopril, hydrochlorothiazide.      PHYSICAL EXAMINATION:   GENERAL:  The patient is awake and alert, sick looking.  Not in respiratory distress.   VITAL SIGNS:  Blood pressure  112/80, pulse rate 89, temperature 100.3, oxygen saturation 93%.   HEENT:  Pink, nonicteric.  Extraocular muscle movement intact.   NECK:  Supple, no JVD, no thyromegaly.   CHEST:  Good air entry bilaterally.  Scattered crackles noted mid chest lung fields, both sides.  Prolonged expiratory phases.  No significant wheezing.   CARDIOVASCULAR:  S1, S2 were heard.  No gallop or murmur.   ABDOMEN:  Soft, nontender, nondistended, positive bowel sounds, no organomegaly.   EXTREMITIES:  No edema, cyanosis or clubbing.   NEUROLOGIC:  No focal neurologic deficit.  Cranial nerves grossly intact.     PSYCHIATRIC: Normal mood and affect.  Keeps eye contact.  Responds to questions appropriately.      DIAGNOSTIC TESTS OF INTEREST:  Sodium 125, potassium 2.6, BUN 19, creatinine 1, calcium 8.7, magnesium 2.2, lactic acid 1.2, glucose 110.  WBC 12.7, hemoglobin 14, platelets 335.  Blood cultures x2 negative.        CT scan of the chest per pulmonary protocol showed bilateral consolidative infiltrate pneumonia, greater on the right. Short-term radiographic followup after therapy recommended to confirm resolution. Mild adenopathy.      ASSESSMENT:  Cb Og is a 47-year-old gentleman with history of hypertension and tobacco dependence, who presented with on and off symptoms of 5 days, got worse in the last 2 days with fever, cough, aches and feeling unwell. He was found to have bilateral pneumonia with electrolyte disturbance and being admitted to the hospital.   1.  Bilateral pneumonia.   2.  Hyponatremia secondary to diuretics.   3.  Hypokalemia secondary to hydrochlorothiazide.   4.  Hypertension, controlled.   5.  Tobacco dependence.      PLAN:  The patient is being admitted as an inpatient.  Started on Zithromax and Rocephin in the emergency room, which I will continue. Follow up blood culture.  The patient does not produce sputum; if he does, sputum culture needs to be ordered. Bronchodilators.  The patient does not require  oxygen at this point, saturating well, and needs to be monitored off oxygen with pulse oximetry.      The patient has electrolyte disturbance with hyponatremia and hypokalemia.  This is due to decreased oral intake and also in the setting of hydrochlorothiazide. HCTZ stopped. The patient got a total of 3 liters bolus of IV fluid in the emergency room.  We will continue on maintenance fluids with sodium chloride and potassium chloride at 100 mL per hour for 1 more liter.  Continue lisinopril at 20, will hold hydrochlorothiazide.  Tobacco cessation counseled.  I discussed with the patient and his wife at length the need of followup imaging studies to make sure the resolution of these infiltrates given his underlying history of tobacco use since childhood.  They understood and agreed with that.  I also discussed with Dr. Muhammad from the emergency room who requested to admit this patient.  We will check a BMP and CBC in the morning.  All his wife and patient's questions and concerns were addressed.  He is FULL CODE.         KELLY MUÑOZ MD             D: 2018   T: 2018   MT:       Name:     TIO GRACE   MRN:      [id number]        Account:      [id number]   :      1970        Admitted:     2018                   Document: [id number]

## 2018-01-26 NOTE — PROGRESS NOTES
Hospitalist Progress Note([facility])  Meliton Chavira MD 01/26/2018      Reason for Stay / current hospital problem list:    Bilateral pneumonia    Hyponatremia           Assessment and Plan:        Summary of Stay:      Cb Og is a 47-year-old gentleman with history of hypertension and tobacco dependence, who presented with on and off symptoms of 5 days, got worse in the last 2 days with fever, cough, aches and feeling unwell. He was found to have bilateral pneumonia with electrolyte disturbance and being admitted to the hospital.     1.  Bilateral pneumonia: Multifocal, community-acquired  --On ceftriaxone and azithromycin , improving, afebrile, breathing much better    2.  Hyponatremia secondary to diuretics.   --Resolved, continue to monitor  3.  Hypokalemia secondary to hydrochlorothiazide.   --Replaced, continue to monitor  4.  Hypertension, controlled.   5.  Tobacco dependence: Counseled  6.  Nonproductive cough: Continue cough medication              DVT Prophylaxis: Ambulate every shift        Code Status: Full Code        Discharge Plan: May discharge in the next 1 day if he continues to do well          Interval History (Subjective):             Patient was seen and examined by me today and medical record reviewed.Overnight events noted and care discussed with nursing staff and treatment team.    doing well; no cp, sob, n/v/d, or abd pain.                   Physical Exam:      Last Vital Signs:  Temp:  [96  F (35.6  C)-100.9  F (38.3  C)] 97.6  F (36.4  C)  Pulse:  [56-73] 65  Heart Rate:  [] 106  Resp:  [16-33] 18  BP: ()/(59-84) 109/74  SpO2:  [92 %-97 %] 94 %    GEN:  Alert, oriented x 3, appears comfortable, NAD.  NECK:Supple ,no mass or thyromegaly   HEENT:  Normocephalic/atraumatic, no scleral icterus, no nasal discharge, mouth moist.  CV:  Regular rate and rhythm, no murmur or JVD.  S1 + S2 noted, no S3 or S4.  Normal peripheral pulses bilaterally with palpable pulse including  dorsalis pedis and posterior tibial arteries  LUNGS:  Clear to auscultation bilaterally without rales/rhonchi/wheezing/retractions.  Symmetric chest rise on inhalation noted.  ABD:  Active bowel sounds, soft, non-tender/non-distended.  No rebound/guarding/rigidity.  EXT:  No edema.  No cyanosis.  No joint synovitis noted.  LGS: No cervical or axillary lymphadenopathy   SKIN:  Dry to touch, no exanthems noted in the visualized areas.  Neurologic:Grossly intact,non focal . No acute focal neurologic deficit   Psychaitric exam: Mood and affect normal                  Medications:      All current medications were reviewed with changes reflected in problem list.         Data:        Wt Readings from Last 5 Encounters:   01/25/18 112.6 kg (248 lb 3.2 oz)       I/O last 3 completed shifts:  In: 845 [I.V.:845]  Out: -     All laboratory and imaging data in the past 24 hours reviewed       Recent Labs  Lab 01/26/18  0800 01/25/18  1500   WBC 9.1 12.7*   HGB 12.0* 14.9   HCT 34.2* 41.9   MCV 91 90    335       Recent Labs  Lab 01/25/18  1750 01/25/18  1739   CULT No growth after 9 hours No growth after 9 hours       Recent Labs  Lab 01/26/18  0800 01/26/18  0058 01/25/18  1500     --  125*   POTASSIUM 3.4 2.6* 2.6*   CHLORIDE 99  --  88*   CO2 30  --  29   ANIONGAP 6  --  8   GLC 88  --  110*   BUN 13  --  19   CR 0.80  --  1.01   GFRESTIMATED >90  --  79   GFRESTBLACK >90  --  >90   JIMMY 7.9*  --  8.7   MAG  --   --  2.2         Recent Labs  Lab 01/26/18  0800 01/25/18  1500   GLC 88 110*           No results for input(s): INR in the last 168 hours.      No results for input(s): TROPONIN, TROPI, TROPR in the last 168 hours.    Invalid input(s): TROP, TROPONINIES    Recent Results (from the past 48 hour(s))   XR Chest 2 Views    Narrative    CHEST TWO VIEWS   1/25/2018 5:15 PM     HISTORY: Cough.     COMPARISON: None.    FINDINGS: 7 cm area of opacity in the right upper lung. This is likely  infiltrate but a mass  could be obscured by infiltrate and follow-up to  resolution is recommended. Left lung clear. Heart size normal.      Impression    IMPRESSION: 7 cm area of opacity right upper lung. This may be  infiltrate. Underlying mass cannot be excluded.    YENNI DORAN MD   CT Chest Pulmonary Embolism w Contrast    Narrative    CT CHEST PULMONARY EMBOLISM WITH CONTRAST1/25/2018 7:14 PM     HISTORY: Evaluate for infection versus mass. Cough.    COMPARISON: None    TECHNIQUE: Pulmonary embolism protocol with attention to the pulmonary  arteries.  IV contrast: 80mL Isovue-370. Coronal reconstructions.  Radiation dose for this scan was reduced using automated exposure  control, adjustment of the mA and/or kV according to patient size, or  iterative reconstruction technique.    FINDINGS: No thoracic aortic dissection. Mildly suboptimal exam with  no definite pulmonary embolism identified. Mild mediastinal  adenopathy. Minimal right hilar adenopathy. No pleural effusion.  Bilateral consolidative infiltrate/pneumonia. This is fairly prominent  in the right upper lobe, moderate in the left upper lobe and  mild/moderate in the left lower lobe.      Impression    IMPRESSION: Bilateral consolidative infiltrate/pneumonia, greater on  the right. Short-term radiographic follow-up after therapy recommended  to confirm resolution. Mild adenopathy.    SUSHILA COSBY MD                 Disclaimer: This note consists of symbols derived from keyboarding, dictation and/or voice recognition software. As a result, there may be errors in the script that have gone undetected. Please consider this when interpreting information found in this chart

## 2018-01-26 NOTE — PLAN OF CARE
Problem: Patient Care Overview  Goal: Individualization & Mutuality  Outcome: Improving  Mr. Og improving today.  Afebrile, satting mid-90's on RA.  Denies SOB, no nausea/vomitting.  BM today, loose.  Plan to continue IV abx and monitor labs.  K+ 3.4 today, Ca+ 7.9.  Pt tolerating fluids and regular diet.  IV fluids discontinued on AM rounds per Dr. Chavira, await official order in plan of care.  Ibuprofen given for c/o headache 4/10, effective.

## 2018-01-27 VITALS
DIASTOLIC BLOOD PRESSURE: 87 MMHG | TEMPERATURE: 98.7 F | HEART RATE: 78 BPM | HEIGHT: 72 IN | OXYGEN SATURATION: 95 % | RESPIRATION RATE: 18 BRPM | BODY MASS INDEX: 33.62 KG/M2 | WEIGHT: 248.2 LBS | SYSTOLIC BLOOD PRESSURE: 118 MMHG

## 2018-01-27 LAB
BASOPHILS # BLD AUTO: 0 10E9/L (ref 0–0.2)
BASOPHILS NFR BLD AUTO: 0.5 %
DIFFERENTIAL METHOD BLD: ABNORMAL
EOSINOPHIL # BLD AUTO: 0 10E9/L (ref 0–0.7)
EOSINOPHIL NFR BLD AUTO: 0.5 %
ERYTHROCYTE [DISTWIDTH] IN BLOOD BY AUTOMATED COUNT: 11.8 % (ref 10–15)
HCT VFR BLD AUTO: 34.6 % (ref 40–53)
HGB BLD-MCNC: 11.8 G/DL (ref 13.3–17.7)
IMM GRANULOCYTES # BLD: 0.3 10E9/L (ref 0–0.4)
IMM GRANULOCYTES NFR BLD: 3.4 %
LYMPHOCYTES # BLD AUTO: 1.4 10E9/L (ref 0.8–5.3)
LYMPHOCYTES NFR BLD AUTO: 16.7 %
MAGNESIUM SERPL-MCNC: 2.1 MG/DL (ref 1.6–2.3)
MCH RBC QN AUTO: 32 PG (ref 26.5–33)
MCHC RBC AUTO-ENTMCNC: 34.1 G/DL (ref 31.5–36.5)
MCV RBC AUTO: 94 FL (ref 78–100)
MONOCYTES # BLD AUTO: 1 10E9/L (ref 0–1.3)
MONOCYTES NFR BLD AUTO: 12 %
NEUTROPHILS # BLD AUTO: 5.5 10E9/L (ref 1.6–8.3)
NEUTROPHILS NFR BLD AUTO: 66.9 %
NRBC # BLD AUTO: 0 10*3/UL
NRBC BLD AUTO-RTO: 0 /100
PLATELET # BLD AUTO: 319 10E9/L (ref 150–450)
POTASSIUM SERPL-SCNC: 3.5 MMOL/L (ref 3.4–5.3)
RBC # BLD AUTO: 3.69 10E12/L (ref 4.4–5.9)
WBC # BLD AUTO: 8.2 10E9/L (ref 4–11)

## 2018-01-27 PROCEDURE — 85025 COMPLETE CBC W/AUTO DIFF WBC: CPT | Performed by: INTERNAL MEDICINE

## 2018-01-27 PROCEDURE — 25000132 ZZH RX MED GY IP 250 OP 250 PS 637: Performed by: INTERNAL MEDICINE

## 2018-01-27 PROCEDURE — 99239 HOSP IP/OBS DSCHRG MGMT >30: CPT | Performed by: INTERNAL MEDICINE

## 2018-01-27 PROCEDURE — 84132 ASSAY OF SERUM POTASSIUM: CPT | Performed by: INTERNAL MEDICINE

## 2018-01-27 PROCEDURE — 25000128 H RX IP 250 OP 636: Performed by: PHYSICIAN ASSISTANT

## 2018-01-27 PROCEDURE — 83735 ASSAY OF MAGNESIUM: CPT | Performed by: INTERNAL MEDICINE

## 2018-01-27 PROCEDURE — 36415 COLL VENOUS BLD VENIPUNCTURE: CPT | Performed by: INTERNAL MEDICINE

## 2018-01-27 RX ORDER — LEVOFLOXACIN 500 MG/1
500 TABLET, FILM COATED ORAL DAILY
Qty: 7 TABLET | Refills: 0 | Status: SHIPPED | OUTPATIENT
Start: 2018-01-27 | End: 2018-02-03

## 2018-01-27 RX ADMIN — LISINOPRIL 20 MG: 20 TABLET ORAL at 08:13

## 2018-01-27 RX ADMIN — AMLODIPINE BESYLATE 5 MG: 5 TABLET ORAL at 08:13

## 2018-01-27 RX ADMIN — SODIUM CHLORIDE 1000 ML: 9 INJECTION, SOLUTION INTRAVENOUS at 01:06

## 2018-01-27 ASSESSMENT — ACTIVITIES OF DAILY LIVING (ADL)
ADLS_ACUITY_SCORE: 11

## 2018-01-27 NOTE — PROGRESS NOTES
[facility]  [facility] [place] [address]  [phone]              To whom it may concern:    Cb Og was under my  professional care for a medical problem from 01/25-27 2018 .   The patient   may return to work with the following: No restrictions on or about  01/30/2018.      Sincerely,    Swain MD

## 2018-01-27 NOTE — PROGRESS NOTES
Patient hospitalized for bilateral pna. Cleared for discharge to home today per MD. Discharge instructions, medications, and follow-ups reviewed with patient and spouse in detail. Discharge medications, including Levoquin were sent to [facility] per pt. request. Patient and spouse verbalized understanding of discharge instructions. Belongings were returned to patient at time of discharge. Wife providing transport home. No further questions at this time.

## 2018-01-27 NOTE — PLAN OF CARE
Problem: Patient Care Overview  Goal: Plan of Care/Patient Progress Review  Outcome: No Change  Pt A&O, up in halls independently, Tier A activity level. VSS, afebrile + sats maintained on RA. C/o headache, PRN Tylenol x1. LS coarse + expiratory wheezes, nonproductive cough. Continues IV Rocephin + zithromax for PNA. Regular diet, good PO intake. Loose BM x2, c.diff negative.

## 2018-01-27 NOTE — PLAN OF CARE
Problem: Patient Care Overview  Goal: Plan of Care/Patient Progress Review  Orientation: A/O x4, calls appropriately  VS stable, low-grade temp, denies pain    LS: course, np cough  GI: + Flatus - BM. Denies N/V. BS hyperactive  : Adequate urine output.   Diet: Regular  PIV: Saline Locked-note to MD to clarify order  Antibiotics: IV Zithromax QD, IV Rocephin QD  Activity: Independent with mobility. Pt slept comfortably throughout shift.   Disposition: Expected discharge  Today pending improvement.

## 2018-01-31 LAB
BACTERIA SPEC CULT: NO GROWTH
BACTERIA SPEC CULT: NO GROWTH
Lab: NORMAL
SPECIMEN SOURCE: NORMAL
SPECIMEN SOURCE: NORMAL

## 2018-02-01 NOTE — DISCHARGE SUMMARY
Physician Discharge Summary     Name: Cb Og    MRN: [id number]     YOB: 1970    Age: 47 year old                                                 Primary care provider: Rudy Reeves      Admit date:  1/25/2018      Discharge date and time: 1/27/2018 11:47 AM       Discharge Physician:  Meliton Chavira        Discharge Diagnosis and brief summary        #1.Bilateral pneumonia:community-acquired    #2.Hyponatremia secondary to diuretics    #3.Hypokalemia          Secondary Diagnosis /chronic medical conditions      Hypertension         Brief Summary of Hospital stay :       Please refer to  Admission H&P note for full details of patient presentation.      Admission Condition: fair     Discharged Condition: stable    /87 (BP Location: Right arm)  Pulse 78  Temp 98.7  F (37.1  C) (Oral)  Resp 18  Ht 1.829 m (6')  Wt 112.6 kg (248 lb 3.2 oz)  SpO2 95%  BMI 33.66 kg/m2       Presenting problem/signs and symptoms:     Cough, fever.     Brief Hospital Summary:    Cb Og is a 47-year-old gentleman with history of hypertension and tobacco dependence, who presented with on and off symptoms of 5 days, got worse in the last 2 days with fever, cough, aches and feeling unwell. He was found to have bilateral pneumonia with electrolyte disturbance and being admitted to the hospital.      1.  Bilateral pneumonia: Multifocal, community-acquired  --On ceftriaxone and azithromycin given  --improved and was changed to po levaquin        2.  Hyponatremia secondary to diuretics.   --Resolved  3.  Hypokalemia secondary to hydrochlorothiazide.   --Replaced  4.  Hypertension, controlled.   5.  Tobacco dependence: Counseled  6.  Nonproductive cough: Continue cough medication             Consultations during hospital stay         None      Major procedure performed/  Significant Diagnostic Studies           Results for orders placed or performed during the hospital encounter of 01/25/18    XR Chest 2 Views    Narrative    CHEST TWO VIEWS   1/25/2018 5:15 PM     HISTORY: Cough.     COMPARISON: None.    FINDINGS: 7 cm area of opacity in the right upper lung. This is likely  infiltrate but a mass could be obscured by infiltrate and follow-up to  resolution is recommended. Left lung clear. Heart size normal.      Impression    IMPRESSION: 7 cm area of opacity right upper lung. This may be  infiltrate. Underlying mass cannot be excluded.    YENNI DORAN MD   CT Chest Pulmonary Embolism w Contrast    Narrative    CT CHEST PULMONARY EMBOLISM WITH CONTRAST1/25/2018 7:14 PM     HISTORY: Evaluate for infection versus mass. Cough.    COMPARISON: None    TECHNIQUE: Pulmonary embolism protocol with attention to the pulmonary  arteries.  IV contrast: 80mL Isovue-370. Coronal reconstructions.  Radiation dose for this scan was reduced using automated exposure  control, adjustment of the mA and/or kV according to patient size, or  iterative reconstruction technique.    FINDINGS: No thoracic aortic dissection. Mildly suboptimal exam with  no definite pulmonary embolism identified. Mild mediastinal  adenopathy. Minimal right hilar adenopathy. No pleural effusion.  Bilateral consolidative infiltrate/pneumonia. This is fairly prominent  in the right upper lobe, moderate in the left upper lobe and  mild/moderate in the left lower lobe.      Impression    IMPRESSION: Bilateral consolidative infiltrate/pneumonia, greater on  the right. Short-term radiographic follow-up after therapy recommended  to confirm resolution. Mild adenopathy.    SUSHILA COSBY MD         Recent Labs  Lab 01/27/18  0723 01/26/18  0800 01/25/18  1500   WBC 8.2 9.1 12.7*   HGB 11.8* 12.0* 14.9   HCT 34.6* 34.2* 41.9   MCV 94 91 90    281 335       Recent Labs  Lab 01/25/18  1750 01/25/18  1739   CULT No growth No growth       Recent Labs  Lab 01/27/18  0723 01/26/18  0800 01/26/18  0058 01/25/18  1500   NA  --  135  --  125*   POTASSIUM 3.5  3.4 2.6* 2.6*   CHLORIDE  --  99  --  88*   CO2  --  30  --  29   ANIONGAP  --  6  --  8   GLC  --  88  --  110*   BUN  --  13  --  19   CR  --  0.80  --  1.01   GFRESTIMATED  --  >90  --  79   GFRESTBLACK  --  >90  --  >90   JIMMY  --  7.9*  --  8.7   MAG 2.1  --   --  2.2         Recent Labs  Lab 01/26/18  0800 01/25/18  1500   GLC 88 110*           No results for input(s): INR in the last 168 hours.            Pending Results           Unresulted Labs Ordered in the Past 30 Days of this Admission     No orders found from 11/26/2017 to 1/26/2018.              Disposition         home      Allergies       No Known Allergies         Patient Instructions and Discharge Medications              Review of your medicines      START taking       Dose / Directions    levofloxacin 500 MG tablet   Commonly known as:  LEVAQUIN   Used for:  Pneumonia of both lungs due to infectious organism, unspecified part of lung        Dose:  500 mg   Take 1 tablet (500 mg) by mouth daily for 7 days   Quantity:  7 tablet   Refills:  0         CONTINUE these medicines which have NOT CHANGED       Dose / Directions    acetaminophen 500 MG tablet   Commonly known as:  TYLENOL        Dose:  1000 mg   Take 1,000 mg by mouth every 6 hours as needed for mild pain   Refills:  0       amLODIPine 5 MG tablet   Commonly known as:  NORVASC        Dose:  5 mg   Take 5 mg by mouth daily   Refills:  0       ibuprofen 800 MG tablet   Commonly known as:  ADVIL/MOTRIN        Dose:  800 mg   Take 800 mg by mouth every 8 hours as needed for moderate pain   Refills:  0       lisinopril-hydrochlorothiazide 20-12.5 MG per tablet   Commonly known as:  PRINZIDE/ZESTORETIC        Dose:  2 tablet   Take 2 tablets by mouth daily   Refills:  0            Where to get your medicines      These medications were sent to [facility]/pharmacy #[id number] - [place] - [id number], [facility] [address]     Phone:  [phone]      levofloxacin 500 MG  tablet              Discharge diet:  Active Diet Order      Diet  cardiac diet        Discharge activity:Activity as tolerated      Discharge follow-up:    Follow up with primary care provider in 7 days or earlier if symptoms return or gets worse.    Follow up with consultant as instructed      Other instructions:    We discussed with Patient/family about detail discharge instructions as well as discharge medications above including potential risks,side effects and benefits.Patient/family understood benefits and potential serious side effects of taking these medications and need to follow up with PCP if the patient develops complications.  Patient is also advised to see a doctor immediately for severe symptoms.          I saw and evaluated the patient on day of discharge and  discharge instructions reviewed  and  all the patient's questions and concerns addressed.Over 30 minutes spent on discharge and coordination of discharge process for this patient.          Disclaimer: This note consists of symbols derived from keyboarding, dictation and/or voice recognition software. As a result, there may be errors in the script that have gone undetected. Please consider this when interpreting information found in this chart